# Patient Record
Sex: FEMALE | Race: WHITE | NOT HISPANIC OR LATINO | Employment: UNEMPLOYED | URBAN - METROPOLITAN AREA
[De-identification: names, ages, dates, MRNs, and addresses within clinical notes are randomized per-mention and may not be internally consistent; named-entity substitution may affect disease eponyms.]

---
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
## 2024-01-18 RX ORDER — LISDEXAMFETAMINE DIMESYLATE CAPSULES 20 MG/1
20 CAPSULE ORAL EVERY MORNING
Qty: 30 CAPSULE | Refills: 0 | Status: SHIPPED | OUTPATIENT
Start: 2024-01-18 | End: 2024-02-17

## 2024-08-13 ENCOUNTER — TELEPHONE (OUTPATIENT)
Dept: OPTOMETRY | Facility: CLINIC | Age: 19
End: 2024-08-13
Payer: COMMERCIAL

## 2024-08-13 NOTE — TELEPHONE ENCOUNTER
----- Message from Tish Johnson sent at 8/12/2024  4:12 PM CDT -----  advice  Received: Today  Nacho Adler Staff  Caller: patient (Today,  3:59 PM)  Type: Needs Medical Advice  Who Called:  Patient  Symptoms (please be specific):    How long has patient had these symptoms:    Pharmacy name and phone #:    Best Call Back Number: [phone]  Additional Information: Pt stated that she needs refill on her contacts. She stated that she is leaving for school and she doesn't have anymore. She stated that she can schedule her next appt in December due to that's jacek she ill be back in town. Please call to advise. Thanks

## 2024-08-13 NOTE — TELEPHONE ENCOUNTER
Spoke to pt regarding getting eye exam with Dr. Francis prior to pt leaving for college on 8-21-24.  Scheduled appt on 8-16-24, however, may not have contact exam finished with finalized clrx prior to her leaving so would not be able to order supply of contacts.   Also, explained to pt that if she uses her vision insurance for exam with Dr. Francis, she will not be able to use again for a year so pt may want to consider waiting on exam and finding an eye doctor near her college.   Pt will call back to discuss if she would like to keep appt or cancel.

## 2024-08-15 ENCOUNTER — TELEPHONE (OUTPATIENT)
Dept: OPTOMETRY | Facility: CLINIC | Age: 19
End: 2024-08-15
Payer: COMMERCIAL

## 2024-08-15 NOTE — TELEPHONE ENCOUNTER
Called pt to discuss eye appt scheduled on 8-16-24.   No answer, LVM.  Pt's [employer] insurance is no longer active.   Pt to call back to discuss if she would like to keep appt and use medical insurance or cancel.